# Patient Record
Sex: MALE | Race: WHITE | NOT HISPANIC OR LATINO | URBAN - METROPOLITAN AREA
[De-identification: names, ages, dates, MRNs, and addresses within clinical notes are randomized per-mention and may not be internally consistent; named-entity substitution may affect disease eponyms.]

---

## 2022-10-11 ENCOUNTER — APPOINTMENT (OUTPATIENT)
Dept: URBAN - METROPOLITAN AREA CLINIC 236 | Age: 32
Setting detail: DERMATOLOGY
End: 2022-10-12

## 2022-10-11 DIAGNOSIS — L63.8 OTHER ALOPECIA AREATA: ICD-10-CM

## 2022-10-11 DIAGNOSIS — L91.8 OTHER HYPERTROPHIC DISORDERS OF THE SKIN: ICD-10-CM

## 2022-10-11 PROCEDURE — OTHER INTRALESIONAL KENALOG: OTHER

## 2022-10-11 PROCEDURE — 11901 INJECT SKIN LESIONS >7: CPT

## 2022-10-11 PROCEDURE — OTHER DEFER: OTHER

## 2022-10-11 PROCEDURE — 99202 OFFICE O/P NEW SF 15 MIN: CPT | Mod: 25

## 2022-10-11 PROCEDURE — OTHER COUNSELING: OTHER

## 2022-10-11 ASSESSMENT — LOCATION DETAILED DESCRIPTION DERM
LOCATION DETAILED: RIGHT MEDIAL MANDIBULAR CHEEK
LOCATION DETAILED: LEFT INFERIOR MEDIAL BUCCAL CHEEK
LOCATION DETAILED: LEFT LOWER CUTANEOUS LIP
LOCATION DETAILED: RIGHT CENTRAL BUCCAL CHEEK
LOCATION DETAILED: RIGHT INFERIOR CENTRAL BUCCAL CHEEK
LOCATION DETAILED: RIGHT MEDIAL SUBMANDIBULAR CHEEK
LOCATION DETAILED: LEFT SUPERIOR MEDIAL BUCCAL CHEEK
LOCATION DETAILED: RIGHT CENTRAL SUBMANDIBULAR CHEEK
LOCATION DETAILED: RIGHT LATERAL SUBMANDIBULAR CHEEK
LOCATION DETAILED: INFERIOR MID FOREHEAD

## 2022-10-11 ASSESSMENT — LOCATION SIMPLE DESCRIPTION DERM
LOCATION SIMPLE: INFERIOR FOREHEAD
LOCATION SIMPLE: RIGHT CHEEK
LOCATION SIMPLE: LEFT LIP
LOCATION SIMPLE: LEFT CHEEK

## 2022-10-11 ASSESSMENT — LOCATION ZONE DERM
LOCATION ZONE: FACE
LOCATION ZONE: LIP

## 2022-10-11 NOTE — PROCEDURE: DEFER
Size Of Lesion In Cm (Optional): 0
Detail Level: Detailed
Scheduling Instructions (Optional): 30 minute procedure slot with Carlos Enrique Fry for skin tag removal
Introduction Text (Please End With A Colon): The following procedure was deferred:
Other Procedure: Skin Tag removal (scissors)

## 2022-10-11 NOTE — HPI: PIMPLES (ACNE)
Is This A New Presentation, Or A Follow-Up?: Acne
Additional Comments (Use Complete Sentences): Patient has tried OTC ointment and it did not help

## 2022-11-29 ENCOUNTER — APPOINTMENT (OUTPATIENT)
Dept: URBAN - METROPOLITAN AREA CLINIC 236 | Age: 32
Setting detail: DERMATOLOGY
End: 2022-11-30

## 2022-11-29 DIAGNOSIS — L90.5 SCAR CONDITIONS AND FIBROSIS OF SKIN: ICD-10-CM

## 2022-11-29 DIAGNOSIS — L63.8 OTHER ALOPECIA AREATA: ICD-10-CM

## 2022-11-29 DIAGNOSIS — L91.8 OTHER HYPERTROPHIC DISORDERS OF THE SKIN: ICD-10-CM

## 2022-11-29 PROCEDURE — 99212 OFFICE O/P EST SF 10 MIN: CPT | Mod: 25

## 2022-11-29 PROCEDURE — OTHER INTRALESIONAL KENALOG: OTHER

## 2022-11-29 PROCEDURE — OTHER COUNSELING: OTHER

## 2022-11-29 PROCEDURE — 11200 RMVL SKIN TAGS UP TO&INC 15: CPT

## 2022-11-29 PROCEDURE — OTHER OBSERVATION: OTHER

## 2022-11-29 PROCEDURE — OTHER SKIN TAG REMOVAL: OTHER

## 2022-11-29 PROCEDURE — 11901 INJECT SKIN LESIONS >7: CPT | Mod: 59

## 2022-11-29 ASSESSMENT — LOCATION DETAILED DESCRIPTION DERM
LOCATION DETAILED: RIGHT LATERAL SUBMANDIBULAR CHEEK
LOCATION DETAILED: RIGHT MEDIAL SUBMANDIBULAR CHEEK
LOCATION DETAILED: LEFT INFERIOR MEDIAL BUCCAL CHEEK
LOCATION DETAILED: NASAL ROOT
LOCATION DETAILED: RIGHT CENTRAL BUCCAL CHEEK
LOCATION DETAILED: LEFT LOWER CUTANEOUS LIP
LOCATION DETAILED: LEFT SUPERIOR MEDIAL BUCCAL CHEEK
LOCATION DETAILED: RIGHT MEDIAL MANDIBULAR CHEEK
LOCATION DETAILED: RIGHT CENTRAL SUBMANDIBULAR CHEEK
LOCATION DETAILED: RIGHT CENTRAL ZYGOMA
LOCATION DETAILED: RIGHT INFERIOR CENTRAL BUCCAL CHEEK

## 2022-11-29 ASSESSMENT — LOCATION ZONE DERM
LOCATION ZONE: NOSE
LOCATION ZONE: LIP
LOCATION ZONE: FACE

## 2022-11-29 ASSESSMENT — LOCATION SIMPLE DESCRIPTION DERM
LOCATION SIMPLE: RIGHT CHEEK
LOCATION SIMPLE: NOSE
LOCATION SIMPLE: LEFT LIP
LOCATION SIMPLE: RIGHT ZYGOMA
LOCATION SIMPLE: LEFT CHEEK

## 2022-11-29 NOTE — PROCEDURE: SKIN TAG REMOVAL
Anesthesia Volume In Cc: 3
Include Z78.9 (Other Specified Conditions Influencing Health Status) As An Associated Diagnosis?: No
Medical Necessity Clause: This procedure was medically necessary because the lesions that were treated were:
Medical Necessity Information: It is in your best interest to select a reason for this procedure from the list below. All of these items fulfill various CMS LCD requirements except the new and changing color options.
Consent: Written consent obtained and the risks of skin tag removal was reviewed with the patient including but not limited to bleeding, pigmentary change, infection, pain, and remote possibility of scarring.
Detail Level: Detailed
Add Associated Diagnoses If Applicable When Selecting Medical Necessity: Yes

## 2022-12-27 ENCOUNTER — APPOINTMENT (OUTPATIENT)
Dept: URBAN - METROPOLITAN AREA CLINIC 236 | Age: 32
Setting detail: DERMATOLOGY
End: 2022-12-28

## 2022-12-27 DIAGNOSIS — L63.8 OTHER ALOPECIA AREATA: ICD-10-CM

## 2022-12-27 PROCEDURE — OTHER INTRALESIONAL KENALOG: OTHER

## 2022-12-27 PROCEDURE — OTHER COUNSELING: OTHER

## 2022-12-27 PROCEDURE — 11901 INJECT SKIN LESIONS >7: CPT

## 2022-12-27 ASSESSMENT — LOCATION SIMPLE DESCRIPTION DERM
LOCATION SIMPLE: RIGHT CHEEK
LOCATION SIMPLE: CHIN
LOCATION SIMPLE: LEFT CHEEK

## 2022-12-27 ASSESSMENT — LOCATION DETAILED DESCRIPTION DERM
LOCATION DETAILED: RIGHT MEDIAL MANDIBULAR CHEEK
LOCATION DETAILED: RIGHT CHIN
LOCATION DETAILED: LEFT INFERIOR LATERAL BUCCAL CHEEK
LOCATION DETAILED: LEFT CHIN
LOCATION DETAILED: LEFT MEDIAL SUBMANDIBULAR CHEEK
LOCATION DETAILED: RIGHT INFERIOR LATERAL BUCCAL CHEEK
LOCATION DETAILED: RIGHT MEDIAL SUBMANDIBULAR CHEEK
LOCATION DETAILED: RIGHT INFERIOR CENTRAL BUCCAL CHEEK
LOCATION DETAILED: LEFT INFERIOR CENTRAL BUCCAL CHEEK

## 2022-12-27 ASSESSMENT — LOCATION ZONE DERM: LOCATION ZONE: FACE

## 2023-01-24 ENCOUNTER — APPOINTMENT (OUTPATIENT)
Dept: URBAN - METROPOLITAN AREA CLINIC 236 | Age: 33
Setting detail: DERMATOLOGY
End: 2023-01-25

## 2023-01-24 DIAGNOSIS — L63.8 OTHER ALOPECIA AREATA: ICD-10-CM

## 2023-01-24 PROCEDURE — OTHER INTRALESIONAL KENALOG: OTHER

## 2023-01-24 PROCEDURE — OTHER COUNSELING: OTHER

## 2023-01-24 PROCEDURE — 11901 INJECT SKIN LESIONS >7: CPT

## 2023-01-24 ASSESSMENT — LOCATION DETAILED DESCRIPTION DERM
LOCATION DETAILED: LEFT INFERIOR LATERAL BUCCAL CHEEK
LOCATION DETAILED: RIGHT CHIN
LOCATION DETAILED: LEFT CENTRAL BUCCAL CHEEK
LOCATION DETAILED: SUBMENTAL CHIN
LOCATION DETAILED: RIGHT INFERIOR MEDIAL BUCCAL CHEEK
LOCATION DETAILED: RIGHT INFERIOR LATERAL BUCCAL CHEEK
LOCATION DETAILED: LEFT CHIN
LOCATION DETAILED: RIGHT CENTRAL BUCCAL CHEEK
LOCATION DETAILED: RIGHT MEDIAL MANDIBULAR CHEEK

## 2023-01-24 ASSESSMENT — LOCATION SIMPLE DESCRIPTION DERM
LOCATION SIMPLE: LEFT CHEEK
LOCATION SIMPLE: SUBMENTAL CHIN
LOCATION SIMPLE: CHIN
LOCATION SIMPLE: RIGHT CHEEK

## 2023-01-24 ASSESSMENT — LOCATION ZONE DERM: LOCATION ZONE: FACE

## 2023-02-28 ENCOUNTER — APPOINTMENT (OUTPATIENT)
Dept: URBAN - METROPOLITAN AREA CLINIC 236 | Age: 33
Setting detail: DERMATOLOGY
End: 2023-03-01

## 2023-02-28 DIAGNOSIS — L63.8 OTHER ALOPECIA AREATA: ICD-10-CM

## 2023-02-28 PROCEDURE — OTHER INTRALESIONAL KENALOG: OTHER

## 2023-02-28 PROCEDURE — 11901 INJECT SKIN LESIONS >7: CPT

## 2023-02-28 PROCEDURE — OTHER COUNSELING: OTHER

## 2023-02-28 ASSESSMENT — LOCATION SIMPLE DESCRIPTION DERM
LOCATION SIMPLE: LEFT CHEEK
LOCATION SIMPLE: RIGHT CHEEK
LOCATION SIMPLE: SUBMENTAL CHIN
LOCATION SIMPLE: CHIN

## 2023-02-28 ASSESSMENT — LOCATION DETAILED DESCRIPTION DERM
LOCATION DETAILED: RIGHT MEDIAL MANDIBULAR CHEEK
LOCATION DETAILED: RIGHT CHIN
LOCATION DETAILED: SUBMENTAL CHIN
LOCATION DETAILED: LEFT CENTRAL BUCCAL CHEEK
LOCATION DETAILED: LEFT CHIN
LOCATION DETAILED: RIGHT CENTRAL BUCCAL CHEEK
LOCATION DETAILED: RIGHT INFERIOR LATERAL BUCCAL CHEEK
LOCATION DETAILED: RIGHT INFERIOR MEDIAL BUCCAL CHEEK
LOCATION DETAILED: LEFT INFERIOR LATERAL BUCCAL CHEEK

## 2023-02-28 ASSESSMENT — LOCATION ZONE DERM: LOCATION ZONE: FACE

## 2023-09-05 ENCOUNTER — APPOINTMENT (OUTPATIENT)
Dept: URBAN - METROPOLITAN AREA CLINIC 236 | Age: 33
Setting detail: DERMATOLOGY
End: 2023-09-06

## 2023-09-05 DIAGNOSIS — L63.8 OTHER ALOPECIA AREATA: ICD-10-CM

## 2023-09-05 PROCEDURE — OTHER COUNSELING: OTHER

## 2023-09-05 PROCEDURE — OTHER INTRALESIONAL KENALOG: OTHER

## 2023-09-05 PROCEDURE — 11901 INJECT SKIN LESIONS >7: CPT

## 2023-09-05 ASSESSMENT — LOCATION SIMPLE DESCRIPTION DERM
LOCATION SIMPLE: LEFT CHEEK
LOCATION SIMPLE: SUBMENTAL CHIN
LOCATION SIMPLE: LEFT SUBMANDIBULAR AREA
LOCATION SIMPLE: NECK

## 2023-09-05 ASSESSMENT — LOCATION DETAILED DESCRIPTION DERM
LOCATION DETAILED: SUBMENTAL CHIN
LOCATION DETAILED: LEFT LATERAL SUBMANDIBULAR CHEEK
LOCATION DETAILED: LEFT CENTRAL LATERAL NECK
LOCATION DETAILED: LEFT SUBMANDIBULAR AREA

## 2023-09-05 ASSESSMENT — LOCATION ZONE DERM
LOCATION ZONE: FACE
LOCATION ZONE: NECK

## 2023-10-09 ENCOUNTER — APPOINTMENT (OUTPATIENT)
Dept: URBAN - METROPOLITAN AREA CLINIC 236 | Age: 33
Setting detail: DERMATOLOGY
End: 2023-10-10

## 2023-10-09 DIAGNOSIS — L63.8 OTHER ALOPECIA AREATA: ICD-10-CM

## 2023-10-09 PROCEDURE — 11900 INJECT SKIN LESIONS </W 7: CPT

## 2023-10-09 PROCEDURE — OTHER COUNSELING: OTHER

## 2023-10-09 PROCEDURE — OTHER INTRALESIONAL KENALOG: OTHER

## 2023-10-09 ASSESSMENT — LOCATION SIMPLE DESCRIPTION DERM
LOCATION SIMPLE: SUBMENTAL CHIN
LOCATION SIMPLE: NECK
LOCATION SIMPLE: LEFT CHEEK
LOCATION SIMPLE: LEFT SUBMANDIBULAR AREA

## 2023-10-09 ASSESSMENT — LOCATION DETAILED DESCRIPTION DERM
LOCATION DETAILED: LEFT LATERAL SUBMANDIBULAR CHEEK
LOCATION DETAILED: SUBMENTAL CHIN
LOCATION DETAILED: LEFT CENTRAL LATERAL NECK
LOCATION DETAILED: LEFT SUBMANDIBULAR AREA

## 2023-10-09 ASSESSMENT — LOCATION ZONE DERM
LOCATION ZONE: FACE
LOCATION ZONE: NECK

## 2024-09-24 ENCOUNTER — APPOINTMENT (OUTPATIENT)
Dept: URBAN - METROPOLITAN AREA CLINIC 236 | Age: 34
Setting detail: DERMATOLOGY
End: 2024-09-25

## 2024-09-24 DIAGNOSIS — L63.8 OTHER ALOPECIA AREATA: ICD-10-CM

## 2024-09-24 PROCEDURE — OTHER MIPS QUALITY: OTHER

## 2024-09-24 PROCEDURE — OTHER INTRALESIONAL KENALOG: OTHER

## 2024-09-24 PROCEDURE — OTHER COUNSELING: OTHER

## 2024-09-24 PROCEDURE — 11900 INJECT SKIN LESIONS </W 7: CPT

## 2024-09-24 ASSESSMENT — LOCATION SIMPLE DESCRIPTION DERM
LOCATION SIMPLE: RIGHT SUBMANDIBULAR AREA
LOCATION SIMPLE: SUBMENTAL CHIN
LOCATION SIMPLE: LEFT SUBMANDIBULAR AREA

## 2024-09-24 ASSESSMENT — LOCATION DETAILED DESCRIPTION DERM
LOCATION DETAILED: RIGHT SUBMANDIBULAR AREA
LOCATION DETAILED: LEFT SUBMANDIBULAR AREA
LOCATION DETAILED: SUBMENTAL CHIN

## 2024-09-24 ASSESSMENT — LOCATION ZONE DERM: LOCATION ZONE: FACE

## 2024-09-24 NOTE — PROCEDURE: INTRALESIONAL KENALOG
Bill For Wasted Drug (Kenalog)?: no
How Many Mls Were Removed From The 40 Mg/Ml (10ml) Vial When Preparing The Injectable Solution?: 0
Kenalog Preparation: Kenalog
Validate Note Data When Using Inventory: Yes
Total Volume (Ccs): .3
Medical Necessity Clause: This procedure was medically necessary because the lesions that were treated were:
Detail Level: Detailed
Consent: The risks of atrophy were reviewed with the patient.
Administered By (Optional): Carlos Enrique Fry PA-C
Kenalog Type Of Vial: Multiple Dose
Show Inventory Tab: Hide
Concentration Of Kenalog Solution Injected (Mg/Ml): 2.5
Ndc# For Kenalog Only: [address]-0

## 2024-10-17 ENCOUNTER — APPOINTMENT (OUTPATIENT)
Dept: URBAN - METROPOLITAN AREA CLINIC 236 | Age: 34
Setting detail: DERMATOLOGY
End: 2024-10-20

## 2024-10-17 DIAGNOSIS — L63.8 OTHER ALOPECIA AREATA: ICD-10-CM

## 2024-10-17 DIAGNOSIS — L72.8 OTHER FOLLICULAR CYSTS OF THE SKIN AND SUBCUTANEOUS TISSUE: ICD-10-CM

## 2024-10-17 PROCEDURE — 11900 INJECT SKIN LESIONS </W 7: CPT

## 2024-10-17 PROCEDURE — OTHER COUNSELING: OTHER

## 2024-10-17 PROCEDURE — OTHER MIPS QUALITY: OTHER

## 2024-10-17 PROCEDURE — OTHER INTRALESIONAL KENALOG: OTHER

## 2024-10-17 ASSESSMENT — LOCATION DETAILED DESCRIPTION DERM
LOCATION DETAILED: RIGHT LATERAL ZYGOMA
LOCATION DETAILED: SUBMENTAL CHIN
LOCATION DETAILED: RIGHT SUBMANDIBULAR AREA

## 2024-10-17 ASSESSMENT — LOCATION ZONE DERM: LOCATION ZONE: FACE

## 2024-10-17 ASSESSMENT — LOCATION SIMPLE DESCRIPTION DERM
LOCATION SIMPLE: SUBMENTAL CHIN
LOCATION SIMPLE: RIGHT ZYGOMA
LOCATION SIMPLE: RIGHT SUBMANDIBULAR AREA

## 2024-10-17 ASSESSMENT — SEVERITY OF ALOPECIA TOOL: % SCALP HAIR LOST: 3

## 2024-10-17 NOTE — PROCEDURE: INTRALESIONAL KENALOG
Bill For Wasted Drug (Kenalog)?: no
How Many Mls Were Removed From The 40 Mg/Ml (10ml) Vial When Preparing The Injectable Solution?: 0
Kenalog Preparation: Kenalog
Validate Note Data When Using Inventory: Yes
Treatment Number (Optional): 4
Total Volume (Ccs): .3
Medical Necessity Clause: This procedure was medically necessary because the lesions that were treated were:
Detail Level: Detailed
Consent: The risks of atrophy were reviewed with the patient.
Administered By (Optional): Carlos Enrique Fry PA-C
Kenalog Type Of Vial: Multiple Dose
Show Inventory Tab: Hide
Concentration Of Kenalog Solution Injected (Mg/Ml): 2.5
Ndc# For Kenalog Only: [address]-1
Treatment Number (Optional): 1

## 2024-11-21 ENCOUNTER — APPOINTMENT (OUTPATIENT)
Dept: URBAN - METROPOLITAN AREA CLINIC 236 | Age: 34
Setting detail: DERMATOLOGY
End: 2024-11-22

## 2024-11-21 DIAGNOSIS — L63.8 OTHER ALOPECIA AREATA: ICD-10-CM

## 2024-11-21 PROCEDURE — 11901 INJECT SKIN LESIONS >7: CPT

## 2024-11-21 PROCEDURE — OTHER COUNSELING: OTHER

## 2024-11-21 PROCEDURE — OTHER INTRALESIONAL KENALOG: OTHER

## 2024-11-21 PROCEDURE — OTHER MIPS QUALITY: OTHER

## 2024-11-21 ASSESSMENT — SEVERITY OF ALOPECIA TOOL: % SCALP HAIR LOST: 5

## 2024-11-21 ASSESSMENT — LOCATION ZONE DERM
LOCATION ZONE: LIP
LOCATION ZONE: FACE

## 2024-11-21 ASSESSMENT — LOCATION DETAILED DESCRIPTION DERM
LOCATION DETAILED: LEFT LOWER CUTANEOUS LIP
LOCATION DETAILED: SUBMENTAL CHIN
LOCATION DETAILED: RIGHT SUBMANDIBULAR AREA
LOCATION DETAILED: RIGHT SUPERIOR PREAURICULAR CHEEK
LOCATION DETAILED: LEFT SUBMANDIBULAR AREA

## 2024-11-21 ASSESSMENT — SEVERITY ASSESSMENT OVERALL AMONG ALL PATIENTS
IN YOUR EXPERIENCE, AMONG ALL PATIENTS YOU HAVE SEEN WITH THIS CONDITION, HOW SEVERE IS THIS PATIENT'S CONDITION?: S1 (LESS THAN 25% HAIR LOSS)

## 2024-11-21 ASSESSMENT — LOCATION SIMPLE DESCRIPTION DERM
LOCATION SIMPLE: LEFT LIP
LOCATION SIMPLE: RIGHT SUBMANDIBULAR AREA
LOCATION SIMPLE: LEFT SUBMANDIBULAR AREA
LOCATION SIMPLE: SUBMENTAL CHIN
LOCATION SIMPLE: RIGHT CHEEK

## 2024-11-21 NOTE — PROCEDURE: INTRALESIONAL KENALOG
Bill For Wasted Drug (Kenalog)?: no
How Many Mls Were Removed From The 40 Mg/Ml (10ml) Vial When Preparing The Injectable Solution?: 0
Kenalog Preparation: Kenalog
Validate Note Data When Using Inventory: Yes
Treatment Number (Optional): 5
Total Volume (Ccs): .7
Medical Necessity Clause: This procedure was medically necessary because the lesions that were treated were:
Detail Level: Detailed
Consent: The risks of atrophy were reviewed with the patient.
Administered By (Optional): Carlos Enrique Fry PA-C
Kenalog Type Of Vial: Multiple Dose
Show Inventory Tab: Hide
Concentration Of Kenalog Solution Injected (Mg/Ml): 2.5
Ndc# For Kenalog Only: [address]-2

## 2025-02-17 ENCOUNTER — APPOINTMENT (OUTPATIENT)
Dept: URBAN - METROPOLITAN AREA CLINIC 236 | Age: 35
Setting detail: DERMATOLOGY
End: 2025-02-18

## 2025-02-17 DIAGNOSIS — L63.8 OTHER ALOPECIA AREATA: ICD-10-CM

## 2025-02-17 PROCEDURE — OTHER COUNSELING: OTHER

## 2025-02-17 PROCEDURE — 11900 INJECT SKIN LESIONS </W 7: CPT

## 2025-02-17 PROCEDURE — OTHER INTRALESIONAL KENALOG: OTHER

## 2025-02-17 PROCEDURE — OTHER MIPS QUALITY: OTHER

## 2025-02-17 ASSESSMENT — LOCATION SIMPLE DESCRIPTION DERM: LOCATION SIMPLE: RIGHT SUBMANDIBULAR AREA

## 2025-02-17 ASSESSMENT — LOCATION ZONE DERM: LOCATION ZONE: FACE

## 2025-02-17 ASSESSMENT — LOCATION DETAILED DESCRIPTION DERM: LOCATION DETAILED: RIGHT SUBMANDIBULAR AREA

## 2025-02-17 NOTE — PROCEDURE: INTRALESIONAL KENALOG
Bill For Wasted Drug (Kenalog)?: no
How Many Mls Were Removed From The 40 Mg/Ml (10ml) Vial When Preparing The Injectable Solution?: 0
Kenalog Preparation: Kenalog
Validate Note Data When Using Inventory: Yes
Treatment Number (Optional): 6
Total Volume (Ccs): .7
Medical Necessity Clause: This procedure was medically necessary because the lesions that were treated were:
Detail Level: Detailed
Consent: The risks of atrophy were reviewed with the patient.
Administered By (Optional): Carlos Enrique Fry PA-C
Kenalog Type Of Vial: Multiple Dose
Show Inventory Tab: Hide
Concentration Of Kenalog Solution Injected (Mg/Ml): 2.5
Ndc# For Kenalog Only: 5555-3576-35